# Patient Record
Sex: FEMALE | URBAN - METROPOLITAN AREA
[De-identification: names, ages, dates, MRNs, and addresses within clinical notes are randomized per-mention and may not be internally consistent; named-entity substitution may affect disease eponyms.]

---

## 2020-07-04 ENCOUNTER — NURSE TRIAGE (OUTPATIENT)
Dept: ADMINISTRATIVE | Facility: CLINIC | Age: 40
End: 2020-07-04

## 2020-07-04 NOTE — TELEPHONE ENCOUNTER
Pt states that she has been exposed to COVID. Has cough and mild SOB at time. States she has appt to get tested on Friday but has questions on where to go to get tested sooner(but she doesn't want to go to ED). Urgent care recommended. Pt states she will go to [facility] urgent care. OOC number provided for further questions/concerns.    Reason for Disposition   [1] Symptoms of COVID-19 (e.g., cough, fever, SOB, or others) AND [2] lives in an area with community spread   COVID-19 Testing, questions about   COVID-19 Prevention and Healthy Living, questions about    Protocols used: CORONAVIRUS (COVID-19) EXPOSURE-A-AH, CORONAVIRUS (COVID-19) DIAGNOSED OR [id number]